# Patient Record
(demographics unavailable — no encounter records)

---

## 2025-05-28 NOTE — PHYSICAL EXAM
[Person] : oriented to person [Place] : oriented to place [Time] : oriented to time [Span Intact] : the attention span was normal [Concentration Intact] : normal concentrating ability [Naming Objects] : no difficulty naming common objects [Fluency] : fluency intact [Comprehension] : comprehension intact [Cranial Nerves Optic (II)] : visual acuity intact bilaterally,  visual fields full to confrontation, pupils equal round and reactive to light [Cranial Nerves Oculomotor (III)] : extraocular motion intact [Cranial Nerves Trigeminal (V)] : facial sensation intact symmetrically [Cranial Nerves Facial (VII)] : face symmetrical [Cranial Nerves Glossopharyngeal (IX)] : tongue and palate midline [Cranial Nerves Accessory (XI - Cranial And Spinal)] : head turning and shoulder shrug symmetric [Cranial Nerves Hypoglossal (XII)] : there was no tongue deviation with protrusion [Motor Strength] : muscle strength was normal in all four extremities [Motor Handedness Right-Handed] : the patient is right hand dominant [Sensation Tactile Decrease] : light touch was intact [Sensation Vibration Decrease] : vibration was intact [3+] : Patella left 3+ [2+] : Ankle jerk left 2+ [Paresis Pronator Drift Right-Sided] : no pronator drift on the right [Paresis Pronator Drift Left-Sided] : no pronator drift on the left [Romberg's Sign] : Romberg's sign was negtive [Past-pointing] : there was no past-pointing [Coordination - Dysmetria Impaired Finger-to-Nose Bilateral] : not present [Plantar Reflex Right Only] : normal on the right [Plantar Reflex Left Only] : normal on the left [FreeTextEntry1] : Movement Exam   Facial expression: mildly reduced Blink rate: reduced   Vertical eye movements intact without square wave jerks   Speech: mild hypophonia   Rigidity of limbs: mild cogwheeling in both side.   Resting tremor: intermittent in Right hand Postural tremor: right hand with latency Action tremor: none     minimal Bradykinesia with finger tapping, hand supination/pronation, foot tapping, foot stomping: more on right than left   No dysmetria with finger to nose   Gait: able to stand without assistance  slightly stooped posture  Normal gait initiation, decent stride length, reduced bilateral arm swing, no freezing of gait, good turning   Retropulsion test: able to recover in 1--2 steps.  /

## 2025-05-28 NOTE — REVIEW OF SYSTEMS
[Loss Of Hearing] : hearing loss [Heart Rate Is Slow] : slow heart rate [As Noted in HPI] : as noted in HPI [Negative] : Heme/Lymph

## 2025-05-28 NOTE — DISCUSSION/SUMMARY
[FreeTextEntry1] : 72 years old RH male presenting for one year of changes in agility and gait along with RBD. Exam notable for right hand resting tremor along with right more than left mild rigidity and bradykinesia concerning for Parkinsonism. No red flag on evaluation today that would be concerning for atypical parkinsonism.  Plan: - discussed impression in detail with patient and his wife. - Will hold off starting any PD meds for nwo as he has minimal symptoms not affecting QOL. - Obtain MR brain w/o contrast to rule out secondary parkinsonism - Obtain Mian scan - Discussed about anxiety management: will start Xanax 0.25 mg PRN daily, discussed potential adverse effects, and advised to try it on the day he is not driving/going outside. -  Encouraged to increase exercise and physical activity and maintain an active social and intellectual life,   More than 50 % of time was used to discuss treatment, therapeutic plan, and prognosis, and patient was counseled on lifestyle, coping, and physical and emotional wellbeing.   Patient is seen and discussed with Dr. Sams Movement Disorder Fellow

## 2025-05-28 NOTE — HISTORY OF PRESENT ILLNESS
[FreeTextEntry1] : 72 year old RH male is here for evaluation of changes in his gait and overall agility for past 1 year.  His wife has been noticing that he is dragging his legs while walking and not swinging his arms. She also noticed some shuffling and changes in posture. Denies any fall. He ambulates without assistive device. He also feels he is overall slow. It takes him longer to complete a task like getting ready, finishing paper. He practices before signing something. He noticed he is slow to type. He can complete all task by himself. No difficulty buttoning shirts, tying shoelaces, using utensils. He notices some occasional tremor in his right hand in certain position. This tremor doesn't interfere with ADLs.  He goes to gym regularly, works with a  2x/week  He sleeps 5-6 hours/night, tens to wake up early, wife notices occasional kicking and punching in sleep specially when he is under stress.  No concern regarding memory, attention, concertation aside from some slowing down. Denies hallucinations. Has been experiencing anxiety since 2020, better now, talks to therapist, not on any medication for mood/anxiety. Denies any depression. Occasional lightheadedness on changing position, no syncope. Has bradycardia. cardiac workup, reportedly normal. Denies any constipation. Increased urinary frequency due to being on diuretic, no incontinence He never had a great sense of smell/taste. Denies any change. NO dysphagia. He reports some hoarseness of voice, which is attributed to using inhaler.     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

## 2025-07-30 NOTE — DISCUSSION/SUMMARY
[FreeTextEntry1] : 72 years old RH male presenting for one year of changes in agility and gait along with RBD. Exam notable for right hand resting tremor along with right more than left mild rigidity and bradykinesia and +ANGELA scan b/l putamen (more on the left side) confirming the Parkinson's disease. No red flag on evaluation today that would be concerning for atypical parkinsonism.  Plan: - discussed impression in detail with patient and his wife. - Will initiate Rasagiline 0.5mg daily for 4 weeks then 1mg daily - Will refer the patient to [facility] for genetic testing - Recommend exploring Parkinson's foundation, the [facility] foundations -  Encouraged to increase exercise and physical activity and maintain an active social and intellectual life,   Follow up in about 3 months with Kai HO and 6 months with Dr. Torres  More than 50 % of time was used to discuss treatment, therapeutic plan, and prognosis, and patient was counseled on lifestyle, coping, and physical and emotional wellbeing.

## 2025-07-30 NOTE — PHYSICAL EXAM
[Person] : oriented to person [Place] : oriented to place [Time] : oriented to time [Cranial Nerves Oculomotor (III)] : extraocular motion intact [Motor Strength] : muscle strength was normal in all four extremities [Motor Handedness Right-Handed] : the patient is right hand dominant [Sensation Tactile Decrease] : light touch was intact [Paresis Pronator Drift Right-Sided] : no pronator drift on the right [Paresis Pronator Drift Left-Sided] : no pronator drift on the left [FreeTextEntry1] : Facial expression: mildly reduced Blink rate: reduced EOMi without square wave jerks  Speech: mild hypophonia, hoarse Rigidity of limbs: mild cogwheeling in both sides.   Resting tremor: intermittent in Right hand Postural tremor: right hand with latency Action tremor: none   minimal Bradykinesia with finger tapping, hand supination/pronation, foot tapping, foot stomping: more on right than left   No dysmetria with finger to nose   Gait: able to stand without assistance, slightly stooped posture Normal gait initiation, decent stride length, reduced bilateral arm swing, no freezing of gait, good turning   Retropulsion test: negative

## 2025-07-30 NOTE — HISTORY OF PRESENT ILLNESS
[FreeTextEntry1] : 72 year old RH male is here for follow up for parkinson's disease in the setting of changes in his gait and overall agility since 2024 07/24/2025 +ANGELA scan b/l putamen (more on the left side).  06/09/2025 MRI brain: no acute findings. Mild brain parenchymal volume loss  His wife has been noticing that he is dragging his legs while walking, shuffling, changes in posture, and not swinging his arms. He ambulates without assistive device. Denies any fall. He is overall slow. It takes him longer to complete a task like getting ready and finishing paper. He practices before signing something. He is slow to type. He can complete all task by himself. He notices some occasional tremor in his right hand in certain position. No difficulty buttoning shirts, tying shoelaces, using utensils. This tremor doesn't interfere with ADLs.  He goes to gym regularly, works with a  2x/week  Sleeps 5-6 hours/night, difficulty with staying asleep 2-3 x/week, tends to wake up early, wife notices occasional kicking and punching in sleep specially when he is under stress Hallucination: no Memory/cognition: no issues Anxiety since 2020: talks to therapist, not on any medication for mood/anxiety. Denies any depression. Occasional lightheadedness on changing position, no syncope. Has bradycardia. cardiac workup, reportedly normal. Denies any constipation. Increased urinary frequency due to being on diuretic, no incontinence He never had a great sense of smell/taste. Denies any change. No dysphagia. He reports some hoarseness of voice, which is attributed to using inhaler.   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

## 2025-07-30 NOTE — HISTORY OF PRESENT ILLNESS
[FreeTextEntry1] : 72 year old RH male is here for follow up for parkinson's disease in the setting of changes in his gait and overall agility since 2024 07/24/2025 +ANGELA scan b/l putamen (more on the left side).  06/09/2025 MRI brain: no acute findings. Mild brain parenchymal volume loss  His wife has been noticing that he is dragging his legs while walking, shuffling, changes in posture, and not swinging his arms. He ambulates without assistive device. Denies any fall. He is overall slow. It takes him longer to complete a task like getting ready and finishing paper. He practices before signing something. He is slow to type. He can complete all task by himself. He notices some occasional tremor in his right hand in certain position. No difficulty buttoning shirts, tying shoelaces, using utensils. This tremor doesn't interfere with ADLs.  He goes to gym regularly, works with a  2x/week  Sleeps 5-6 hours/night, difficulty with staying asleep 2-3 x/week, tends to wake up early, wife notices occasional kicking and punching in sleep specially when he is under stress Hallucination: no Memory/cognition: no issues Anxiety since 2020: talks to therapist, not on any medication for mood/anxiety. Denies any depression. Occasional lightheadedness on changing position, no syncope. Has bradycardia. cardiac workup, reportedly normal. Denies any constipation. Increased urinary frequency due to being on diuretic, no incontinence He never had a great sense of smell/taste. Denies any change. No dysphagia. He reports some hoarseness of voice, which is attributed to using inhaler.   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